# Patient Record
Sex: FEMALE | Race: WHITE | Employment: OTHER | URBAN - METROPOLITAN AREA
[De-identification: names, ages, dates, MRNs, and addresses within clinical notes are randomized per-mention and may not be internally consistent; named-entity substitution may affect disease eponyms.]

---

## 2023-10-16 PROBLEM — R05.9 COUGH: Status: ACTIVE | Noted: 2023-10-16

## 2023-10-16 RX ORDER — LEVONORGESTREL AND ETHINYL ESTRADIOL 0.15-0.03
KIT ORAL
COMMUNITY
Start: 2013-04-11

## 2023-10-16 RX ORDER — NORGESTIMATE AND ETHINYL ESTRADIOL 0.25-0.035
KIT ORAL
COMMUNITY
Start: 2013-03-12

## 2023-10-16 RX ORDER — ALBUTEROL SULFATE 90 UG/1
2 AEROSOL, METERED RESPIRATORY (INHALATION) EVERY 6 HOURS PRN
COMMUNITY
Start: 2013-11-19

## 2023-10-16 RX ORDER — PROMETHAZINE HYDROCHLORIDE AND DEXTROMETHORPHAN HYDROBROMIDE 6.25; 15 MG/5ML; MG/5ML
SYRUP ORAL
COMMUNITY
Start: 2013-11-19

## 2023-10-18 ENCOUNTER — OFFICE VISIT (OUTPATIENT)
Dept: DERMATOLOGY | Facility: CLINIC | Age: 48
End: 2023-10-18
Payer: MEDICARE

## 2023-10-18 DIAGNOSIS — L57.8 SUN-DAMAGED SKIN: ICD-10-CM

## 2023-10-18 DIAGNOSIS — L57.0 ACTINIC KERATOSIS: ICD-10-CM

## 2023-10-18 DIAGNOSIS — D22.9 MULTIPLE BENIGN MELANOCYTIC NEVI: ICD-10-CM

## 2023-10-18 DIAGNOSIS — L82.1 SEBORRHEIC KERATOSES: ICD-10-CM

## 2023-10-18 DIAGNOSIS — L73.9 FOLLICULITIS: Primary | ICD-10-CM

## 2023-10-18 DIAGNOSIS — I78.1 TELANGIECTASIA: ICD-10-CM

## 2023-10-18 PROCEDURE — 99204 OFFICE O/P NEW MOD 45 MIN: CPT | Performed by: DERMATOLOGY

## 2023-10-18 RX ORDER — CLINDAMYCIN PHOSPHATE 10 UG/ML
LOTION TOPICAL
Qty: 60 ML | Refills: 11 | Status: SHIPPED | OUTPATIENT
Start: 2023-10-18

## 2023-10-18 RX ORDER — BENZOYL PEROXIDE 100 MG/ML
LIQUID TOPICAL
Qty: 148 ML | Refills: 11 | Status: SHIPPED | OUTPATIENT
Start: 2023-10-18

## 2023-10-18 NOTE — PROGRESS NOTES
I saw and evaluated the patient, participating in the key elements of the service.  I discussed the findings, assessment and plan with the resident and agree with resident’s findings and plan as documented in the resident's note.  I was immediately available for the entirety of the procedure(s) and present for the key and critical portions.     Sonal Tavera MD

## 2023-10-18 NOTE — PROGRESS NOTES
Subjective     Bailee Chawla is a 48 y.o. female who presents for the following: Skin Check.     New patient visit for skin check. Previously seen at [facility]. Last skin check was about 8 years ago. Denies prior history of skin cancer. Reports blistering sunburns in the past. Denies tanning bed use. Denies family history of melanoma.     Reports acne break outs on the posterior hips and bottom for months. It is flaring today. It is worse after she sweats and plays pickle ball. She denies prior treatments.     Review of Systems:  No other skin or systemic complaints other than what is documented elsewhere in the note.    The following portions of the chart were reviewed this encounter and updated as appropriate:       Specialty Problems    None    Past Medical History:  Bailee Chawla  has no past medical history on file.    Past Surgical History:  Bailee Chawla  has no past surgical history on file.    Family History:  Patient family history is not on file.    Social History:  Bailee Chawla  reports that she has never smoked. She does not have any smokeless tobacco history on file. She reports current alcohol use of about 2.0 standard drinks of alcohol per week. No history on file for drug use.    Allergies:  Patient has no known allergies.    Current Medications / CAM's:    Current Outpatient Medications:     albuterol (Ventolin HFA) 90 mcg/actuation inhaler, Inhale 2 puffs every 6 hours if needed., Disp: , Rfl:     levonorgestreL-ethinyl estrad (Jolessa) 0.15 mg-30 mcg (91) tablet, Take by mouth.  Take as directed, Disp: , Rfl:     norgestimate-ethinyl estradioL (Ortho-Cyclen) 0.25-35 mg-mcg tablet, Take by mouth., Disp: , Rfl:     promethazine-DM (Phenergan-DM) 6.25-15 mg/5 mL syrup, Take by mouth. TAKE 1 TEASPOONFUL EVERY 4 TO 6 HOURS AS NEEDED FOR COUGH., Disp: , Rfl:      Objective   Well appearing patient in no apparent distress; mood and affect are within normal limits.    A full examination was  performed including scalp, head, eyes, ears, nose, lips, neck, chest, axillae, abdomen, back, buttocks, bilateral upper extremities, bilateral lower extremities, hands, feet, fingers, toes, fingernails, and toenails. Exam limited by nail polish on the toenails. All findings within normal limits unless otherwise noted below.    - scattered tan macules, telangiectasias, and general photo-damage    - Scattered waxy tan/grey/brown papules with horn cysts    - scattered regular brown macules and papules    Left Hip (side) - Posterior, Right Hip (side) - Posterior  Erythematous papules and pustules on the buttocks and hips bilaterally    Right Eyebrow  Erythematous macules with gritty scale.       Assessment/Plan     Folliculitis  Left Hip (side) - Posterior; Right Hip (side) - Posterior    Superficial Folliculitis - flaring today  - Reviewed that this is similar to acne and can be aggravated by moisture.  - Start benzoyl peroxide 10% wash once daily on the shower on the buttocks. Let site for 1-2 minutes before rinsing off  - Start clindamycin lotion twice daily on the buttocks    Sun-damaged skin    Actinically damaged skin-  - Sun protective behavior reviewed and encouraged including the use of over-the-counter sunscreen with SPF30+ daily (reapply every 1.5 hours when outdoors), UPF clothing, broad rimmed hats, sunglasses, and avoidance of midday sun. Home skin monitoring encouraged and how to monitor for skin cancer (changing or new moles, new rapidly growing or non-healing lesions) reviewed. Patient encouraged to call with interval concerns or changes.      Seborrheic keratoses    Seborrheic keratosis (-es)  - Discussed benign nature and that no treatment is necessary unless it becomes painful or increases in size. Patient opts for clinical monitoring at this time.      Multiple benign melanocytic nevi    Benign melanocytic nevi  - Discussed benign nature and that no treatment is necessary unless it becomes painful  or increases in size. Patient opts for clinical monitoring at this time.    - Sun protective behavior reviewed and encouraged including the use of over-the-counter sunscreen with SPF30+ daily (reapply every 1.5 hours when outdoors), UPF clothing, broad rimmed hats, sunglasses, and avoidance of midday sun. Home skin monitoring encouraged and how to monitor for skin cancer (changing or new moles, new rapidly growing or non-healing lesions) reviewed. Patient encouraged to call with interval concerns or changes.      Actinic keratosis  Right Eyebrow    Actinic keratoses x 1 on the right eyebrow  - Reviewed the precancerous nature of this lesion and if left on treated may progress to become a squamous cell carcinoma which is a type of non-melanoma skin cancer  - Recommended treatment with cryotherapy today  - Reviewed side effects including dyspigmentation, scarring. Discussed that a blister or scab may form at site of treatment   - Patient provided verbal consent for cryotherapy today  - See procedure note for further details  - Wound care instructions reviewed     Destr of lesion - Right Eyebrow  Complexity: simple    Destruction method: cryotherapy    Informed consent: discussed and consent obtained    Lesion destroyed using liquid nitrogen: Yes    Cryotherapy cycles:  1  Outcome: patient tolerated procedure well with no complications    Post-procedure details: wound care instructions given         Follow up in 1 year for skin check or sooner if lesions of concern    Haydee Chavarria MD

## 2023-10-18 NOTE — PROGRESS NOTES
Subjective     Bailee Chawla is a 48 y.o. female who presents for the following: Skin Check.     New patient visit for skin check. Previously seen at [facility]. Last skin check was about 8 years ago. Denies prior history of skin cancer. Reports blistering sunburns in the past. Denies tanning bed use. Denies family history of melanoma. Grandfather with history of non-melanoma skin cancer.    Reports acne break outs on the posterior hips and bottom for months. It is flaring today. It is worse after she sweats and plays pickle ball. She denies prior treatments.     Review of Systems:  No other skin or systemic complaints other than what is documented elsewhere in the note.    The following portions of the chart were reviewed this encounter and updated as appropriate:       Specialty Problems    None    Past Medical History:  Bailee Chawla  has no past medical history on file.    Past Surgical History:  Bailee Chawla  has no past surgical history on file.    Family History:  Patient family history is not on file.    Social History:  Bailee Chawla  reports that she has never smoked. She does not have any smokeless tobacco history on file. She reports current alcohol use of about 2.0 standard drinks of alcohol per week. No history on file for drug use.    Allergies:  Patient has no known allergies.    Current Medications / CAM's:    Current Outpatient Medications:     albuterol (Ventolin HFA) 90 mcg/actuation inhaler, Inhale 2 puffs every 6 hours if needed., Disp: , Rfl:     benzoyl peroxide (Benzac AC) 10 % external wash, Apply to the affected areas once daily in the shower and let sit for 1-2 minutes before rinsing off, Disp: 148 mL, Rfl: 11    clindamycin (Cleocin T) 1 % lotion, Apply twice daily on the affected area, Disp: 60 mL, Rfl: 11    levonorgestreL-ethinyl estrad (Jolessa) 0.15 mg-30 mcg (91) tablet, Take by mouth.  Take as directed, Disp: , Rfl:     norgestimate-ethinyl estradioL (Ortho-Cyclen)  0.25-35 mg-mcg tablet, Take by mouth., Disp: , Rfl:     promethazine-DM (Phenergan-DM) 6.25-15 mg/5 mL syrup, Take by mouth. TAKE 1 TEASPOONFUL EVERY 4 TO 6 HOURS AS NEEDED FOR COUGH., Disp: , Rfl:      Objective   Well appearing patient in no apparent distress; mood and affect are within normal limits.    A full examination was performed including scalp, head, eyes, ears, nose, lips, neck, chest, axillae, abdomen, back, buttocks, bilateral upper extremities, bilateral lower extremities, hands, feet, fingers, toes, fingernails, and toenails. Exam limited by nail polish on the toenails. All findings within normal limits unless otherwise noted below.    - scattered tan macules, telangiectasias, and general photo-damage    - Scattered waxy tan/grey/brown papules with horn cysts    - scattered regular brown macules and papules    Left Hip (side) - Posterior, Right Hip (side) - Posterior  Erythematous papules and pustules on the buttocks and hips bilaterally    Right Supraorbital Region  Telangiectasia on the right eyebrow       Assessment/Plan     Superficial Folliculitis and in-grown hair, mild background keratosis pilaris - flaring today on hips and bottom, groin  - Reviewed that this is similar to acne and can be aggravated by moisture and friction. She has a few discreet small pustules so favor folliculitis > all irritated keratosis pilaris  - Start benzoyl peroxide 10% wash once daily on the shower on the buttocks. Let site for 1-2 minutes before rinsing off  - Start clindamycin lotion twice daily on the buttocks    Sun-damaged skin  - Sun protective behavior reviewed and encouraged including the use of over-the-counter sunscreen with SPF30+ daily (reapply every 1.5 hours when outdoors), UPF clothing, broad rimmed hats, sunglasses, and avoidance of midday sun. Home skin monitoring encouraged and how to monitor for skin cancer (changing or new moles, new rapidly growing or non-healing lesions) reviewed. Patient  encouraged to call with interval concerns or changes.    - Patient would like to discuss skin care routine  - Currently using vitamin C  - START OTC differin or la rosche posay adapalene cream once nightly  - Continue with vitamin C and sunscreen  - Recommend follow up for cosmetic consultation visit if she would like to have a more thorough discussion about skin care routines and Vbeam for facial erythema and telangiectasias, she is aware of cosmetic fee.     Seborrheic keratoses  - Discussed benign nature and that no treatment is necessary unless it becomes painful or increases in size. Patient opts for clinical monitoring at this time.    -Discussed option of cosmetic removal if desired in the future.     Multiple benign melanocytic nevi  - Discussed benign nature and that no treatment is necessary unless it becomes painful or increases in size. Patient opts for clinical monitoring at this time.    - Sun protective behavior reviewed and encouraged including the use of over-the-counter sunscreen with SPF30+ daily (reapply every 1.5 hours when outdoors), UPF clothing, broad rimmed hats, sunglasses, and avoidance of midday sun. Home skin monitoring encouraged and how to monitor for skin cancer (changing or new moles, new rapidly growing or non-healing lesions) reviewed. Patient encouraged to call with interval concerns or changes.      Telangiectasia - right eyebrow  - Reassured pt of benign nature of this lesion and no treatment is needed  - Could consider laser treatment if desired. Discussed resident discount and cosmetic consultation per above.     Follow up if desired for cosmetic consult for telangiectasias/facial erythema and/or seborrheic keratosis      Sonal Tavera MD

## 2023-12-27 ENCOUNTER — ANCILLARY PROCEDURE (OUTPATIENT)
Dept: RADIOLOGY | Facility: CLINIC | Age: 48
End: 2023-12-27
Payer: MEDICARE

## 2023-12-27 DIAGNOSIS — Z00.00 ENCOUNTER FOR GENERAL ADULT MEDICAL EXAMINATION WITHOUT ABNORMAL FINDINGS: ICD-10-CM

## 2023-12-27 PROCEDURE — 77067 SCR MAMMO BI INCL CAD: CPT | Performed by: RADIOLOGY

## 2023-12-27 PROCEDURE — 77067 SCR MAMMO BI INCL CAD: CPT

## 2023-12-27 PROCEDURE — 77063 BREAST TOMOSYNTHESIS BI: CPT | Performed by: RADIOLOGY

## 2024-01-05 ENCOUNTER — HOSPITAL ENCOUNTER (OUTPATIENT)
Dept: RADIOLOGY | Facility: EXTERNAL LOCATION | Age: 49
Discharge: HOME | End: 2024-01-05

## 2025-01-06 ENCOUNTER — HOSPITAL ENCOUNTER (OUTPATIENT)
Dept: RADIOLOGY | Facility: CLINIC | Age: 50
Discharge: HOME | End: 2025-01-06
Payer: MEDICARE

## 2025-01-06 VITALS — WEIGHT: 139 LBS | BODY MASS INDEX: 18.83 KG/M2 | HEIGHT: 72 IN

## 2025-01-06 DIAGNOSIS — Z12.31 ENCOUNTER FOR SCREENING MAMMOGRAM FOR MALIGNANT NEOPLASM OF BREAST: ICD-10-CM

## 2025-01-06 PROCEDURE — 77067 SCR MAMMO BI INCL CAD: CPT

## 2025-01-06 PROCEDURE — 77067 SCR MAMMO BI INCL CAD: CPT | Performed by: RADIOLOGY

## 2025-01-06 PROCEDURE — 77063 BREAST TOMOSYNTHESIS BI: CPT | Performed by: RADIOLOGY
